# Patient Record
Sex: MALE | Race: WHITE | NOT HISPANIC OR LATINO | Employment: FULL TIME | URBAN - METROPOLITAN AREA
[De-identification: names, ages, dates, MRNs, and addresses within clinical notes are randomized per-mention and may not be internally consistent; named-entity substitution may affect disease eponyms.]

---

## 2021-04-05 ENCOUNTER — OFFICE VISIT - HEALTHEAST (OUTPATIENT)
Dept: FAMILY MEDICINE | Facility: CLINIC | Age: 36
End: 2021-04-05

## 2021-04-05 DIAGNOSIS — J45.20 MILD INTERMITTENT ASTHMA WITHOUT COMPLICATION: ICD-10-CM

## 2021-04-05 ASSESSMENT — MIFFLIN-ST. JEOR: SCORE: 2223.33

## 2021-05-04 ENCOUNTER — HOSPITAL ENCOUNTER (EMERGENCY)
Dept: EMERGENCY MEDICINE | Facility: CLINIC | Age: 36
Discharge: HOME | End: 2021-05-04
Attending: EMERGENCY MEDICINE
Payer: COMMERCIAL

## 2021-05-04 ENCOUNTER — OFFICE VISIT - HEALTHEAST (OUTPATIENT)
Dept: FAMILY MEDICINE | Facility: CLINIC | Age: 36
End: 2021-05-04

## 2021-05-04 DIAGNOSIS — R41.0 CONFUSION: ICD-10-CM

## 2021-05-04 DIAGNOSIS — S06.0X0A CONCUSSION WITHOUT LOSS OF CONSCIOUSNESS, INITIAL ENCOUNTER: ICD-10-CM

## 2021-05-04 DIAGNOSIS — S09.90XA TRAUMATIC INJURY OF HEAD, INITIAL ENCOUNTER: ICD-10-CM

## 2021-05-10 ENCOUNTER — OFFICE VISIT - HEALTHEAST (OUTPATIENT)
Dept: FAMILY MEDICINE | Facility: CLINIC | Age: 36
End: 2021-05-10

## 2021-05-10 DIAGNOSIS — S06.0X0D CONCUSSION WITHOUT LOSS OF CONSCIOUSNESS, SUBSEQUENT ENCOUNTER: ICD-10-CM

## 2021-05-10 ASSESSMENT — MIFFLIN-ST. JEOR: SCORE: 2192.48

## 2021-05-27 VITALS
HEART RATE: 65 BPM | WEIGHT: 268 LBS | OXYGEN SATURATION: 97 % | SYSTOLIC BLOOD PRESSURE: 129 MMHG | RESPIRATION RATE: 16 BRPM | DIASTOLIC BLOOD PRESSURE: 87 MMHG | TEMPERATURE: 98.1 F

## 2021-05-27 VITALS — WEIGHT: 268 LBS

## 2021-05-27 VITALS
HEIGHT: 74 IN | HEART RATE: 76 BPM | BODY MASS INDEX: 33.97 KG/M2 | SYSTOLIC BLOOD PRESSURE: 128 MMHG | DIASTOLIC BLOOD PRESSURE: 90 MMHG | WEIGHT: 264.7 LBS | OXYGEN SATURATION: 99 %

## 2021-05-28 ASSESSMENT — ASTHMA QUESTIONNAIRES: ACT_TOTALSCORE: 16

## 2021-05-29 ENCOUNTER — RECORDS - HEALTHEAST (OUTPATIENT)
Dept: ADMINISTRATIVE | Facility: CLINIC | Age: 36
End: 2021-05-29

## 2021-06-05 ENCOUNTER — HEALTH MAINTENANCE LETTER (OUTPATIENT)
Age: 36
End: 2021-06-05

## 2021-06-05 VITALS
HEART RATE: 98 BPM | HEIGHT: 74 IN | OXYGEN SATURATION: 99 % | BODY MASS INDEX: 34.84 KG/M2 | SYSTOLIC BLOOD PRESSURE: 138 MMHG | DIASTOLIC BLOOD PRESSURE: 92 MMHG | WEIGHT: 271.5 LBS

## 2021-06-16 PROBLEM — E66.9 OBESITY (BMI 30-39.9): Status: ACTIVE | Noted: 2021-04-05

## 2021-06-16 PROBLEM — J45.20 MILD INTERMITTENT ASTHMA: Status: ACTIVE | Noted: 2021-04-05

## 2021-06-16 NOTE — PROGRESS NOTES
"    Assessment & Plan     Mild intermittent asthma without complication    I did refill his albuterol inhaler today.  We talked about allergy medications which might be helpful as well.  He does take Zyrtec-D on occasion, but did not require a refill of that today.  We talked about some other options we could use that I do not think he needs a steroid inhaler at this time as it seems to be pretty seasonal and fairly mild.    We also talked to him today about coming out in for a physical at his convenience or we could do some screening blood work and catch him up on preventative issues.      - albuterol (PROAIR HFA;PROVENTIL HFA;VENTOLIN HFA) 90 mcg/actuation inhaler; Inhale 2 puffs every 6 (six) hours as needed for wheezing.    69}     BMI:   Estimated body mass index is 35.33 kg/m  as calculated from the following:    Height as of this encounter: 6' 1.5\" (1.867 m).    Weight as of this encounter: 271 lb 8 oz (123.2 kg).       No follow-ups on file.    Jeremy Resendez MD  [facility]   Luís Gamez is 36 y.o. and presents today for the following health issues   HPI     New patient to our clinic who is here today for refills of his asthma medication.  He states that he has mild intermittent asthma and uses an albuterol inhaler at times when he has some wheezing.  It seems to be mostly spring and the fall we will get a couple of weeks and in each season where he gets a bit wheezy.  He also notices it sometimes when he exercises.  He uses an antihistamine at times when he gets allergy symptoms and wheezing as well and that seems to help a bit as well.  He has not had any of the medication right now and would like to have a refill and get a couple of them if he could.    Review of Systems    A comprehensive Review of Systems was performed, and other than the positives mentioned above, the ROS was negative.     Patient is new patient to the clinic. Please see past medical " "history, surgical history, social history and family history, all of which were completed in their entirety today.         Objective    BP (!) 138/92 (Patient Site: Left Arm, Patient Position: Sitting, Cuff Size: Adult Large)   Pulse 98   Ht 6' 1.5\" (1.867 m)   Wt (!) 271 lb 8 oz (123.2 kg)   SpO2 99%   BMI 35.33 kg/m    Body mass index is 35.33 kg/m .  Physical Exam    Well-appearing male no acute distress.  Vital signs as noted.  His chest is clear to auscultation all fields today.  Heart regular rate and rhythm.              "

## 2021-06-17 NOTE — ED TRIAGE NOTES
Pt hit top of  head on washing machine last night. Denies LOC. States pain is 4/10. Pt slow to respond to questions and wife states he is acting real different. Pt hand grasps equal but pt states right hand has been going numb on and off today. C collar put on pt. Pt wife states he has had issues with his neck and concussions in the past. No marks or dried blood on head.

## 2021-06-17 NOTE — ED NOTES
Expected Patient Referral to ED  4:48 PM    Referring Clinic/Provider:  [facility].     Reason for referral/Clinical facts:  Concern for absence seizures or head injury.     Recommendations provided:  We do not have neuro coverage at . [facility] or other facility may be more appropriate if concern for neuro problem such as seizure. Would be happy to evaluate at  if needed.     Caller was informed that this institution does not possess the capabilities and/or resources to provide for patient and should be transferred to another institution.    Based on the information provided, discussed that this patient likely is not a good candidate for direct admission to this institution and that provider could proceed as such.  If however direct admit is sought and any hurdles encountered, this ED would be happy to see the patient and evaluate.    Informed caller that recommendations provided are recommendations based only on the facts provided and that they responsible to accept or reject the advice, or to seek a formal in person consultation as needed and that this ED will see/treat patient should they arrive.      Jass Kwok M.D.  Emergency Medicine  [facility] EMERGENCY ROOM  [address] [address]  Dept: [phone]  Loc: [phone]       Jass Kwok MD  05/04/21 5574

## 2021-06-17 NOTE — PROGRESS NOTES
Assessment & Plan:       1. Traumatic injury of head, initial encounter     2. Confusion           Medical Decision Making  Patient presents with acute head trauma.  Patient's examination is concerning for possible intracranial hemorrhage versus absence seizure's versus stroke given his level of confusion.  Throughout my examination and while talking with the patient and the patient's wife, the patient would appear to go blank with a flat affect and become confused as if he forgot where he was.  After calling the patient's name, patient would jump and appears startled before realizing where he was again.  When questioning the patient, he is orientated to person, place, and time.  Recommend the patient be seen at the [facility] emergency room for immediate evaluation of head trauma associated with confusion.  Called [facility] ED and discussed case with provider.  Patient is otherwise vitally stable at this time, and patient and patient's family feel comfortable leaving by private vehicle.      Subjective:       History provided by patient's wife.  Luís Gamez is a 36 y.o. male here for evaluation of head trauma.  Event of injury occurred last night.  Patient struck the top of his head on the top of the washing machine door.  He developed instant headache.  Patient went to bed and then woke up this morning with the same headache.  As the day has gone on, patient has become increasingly confused per the wife.  Wife states that patient will be wincing like he is in pain, and then suddenly developed a blank expression for several seconds.  Associated symptoms include tingling in the right hand.  Patient otherwise denies vision changes, nausea, vomiting, slurred speech, and muscle weakness.  Patient has history of 3 concussions.    The following portions of the patient's history were reviewed and updated as appropriate: allergies, current medications and problem list.    Review of Systems  Pertinent items are noted in  HPI.     Allergies  Allergies   Allergen Reactions     Amoxicillin Unknown     Clavulanic Acid      Seasonale (91) [Levonorgestrel-Ethinyl Estrad]      Spring and Fall     Shellfish Containing Products      Other reaction(s): *Unknown     Cat Dander Other (See Comments)     Itchy back of throat and watery eyes     Environmental [Flowers] Other (See Comments)     Flower - Connie - Itchy back of throat and watery eyes       Family History   Problem Relation Age of Onset     No Medical Problems Mother      Hyperlipidemia Father      Diabetes Father      Diabetes Paternal Grandmother      Diabetes Paternal Grandfather        Social History     Socioeconomic History     Marital status: Single     Spouse name: None     Number of children: None     Years of education: None     Highest education level: None   Occupational History     None   Social Needs     Financial resource strain: None     Food insecurity     Worry: None     Inability: None     Transportation needs     Medical: None     Non-medical: None   Tobacco Use     Smoking status: Never Smoker     Smokeless tobacco: Never Used   Substance and Sexual Activity     Alcohol use: Yes     Alcohol/week: 3.0 - 4.0 standard drinks     Types: 3 - 4 Shots of liquor per week     Drug use: Never     Sexual activity: Yes     Partners: Female   Lifestyle     Physical activity     Days per week: None     Minutes per session: None     Stress: None   Relationships     Social connections     Talks on phone: None     Gets together: None     Attends [employer] service: None     Active member of club or organization: None     Attends meetings of clubs or organizations: None     Relationship status: None     Intimate partner violence     Fear of current or ex partner: None     Emotionally abused: None     Physically abused: None     Forced sexual activity: None   Other Topics Concern     None   Social History Narrative     None         Objective:       /87   Pulse 65   Temp 98.1  F  (36.7  C)   Resp 16   Wt (!) 268 lb (121.6 kg)   SpO2 97%   BMI 34.88 kg/m    General appearance: appears stated age, cooperative, mild distress and non-toxic  Neurologic: Patient orientated to person place and time, but during review of history patient would frequently go blank and developed a flat affect, we would have to call the patient's name and he would jump is a surprised or startled

## 2021-06-17 NOTE — ED PROVIDER NOTES
EMERGENCY DEPARTMENT ENCOUNTER      NAME: Luís Gamez  AGE: 36 y.o. male  YOB: 1985  MRN: [id number]  EVALUATION DATE & TIME: 5/4/2021  5:12 PM    PCP: Provider, No Primary Care    ED PROVIDER: James Abreu M.D.      Chief Complaint   Patient presents with     Head Injury     Numbness         FINAL IMPRESSION:  1. Concussion without loss of consciousness, initial encounter          ED COURSE & MEDICAL DECISION MAKING:    Pertinent Labs & Imaging studies reviewed. (See chart for details)    5:21 PM I cleared the patient's C-spine.  5:36 PM I met with the patient to gather history and to perform my initial exam.  I discussed treatment options and the plan for care while in the Emergency Department. Proper PPE worn throughout all patient interactions including an n95 mask, gloves and goggles.   7:29 PM Rechecked the patient and updated on results. He reports feeling much better. We discussed plan for discharge with neurology follow up to which he is agreeable.        Patient is a 36-year-old gentleman with history of prior concussions who presents with headache, behavioral changes that began at about 9 PM last night when he struck his head on the door of a washer machine.  No loss of consciousness at that time, patient does not seem to have any gaps in his memory since then.  His significant other is concerned to these episodic staring spells.  In the room he has had a couple of these, but he it does not seem to be consistent with an absence seizure, he is incredibly redirectable during these and it appears as though he is more distracted by his pain and there is possibly some mild confusion due to postconcussive syndrome.    He also explain some intermittent paresthesias in his right hand today.  Because of the head injury, intermittent symptoms in his hand and the fact that he gets regular cervical manipulations by a chiropractor a CTA of the head and neck was ordered.    Patient was feeling much  better after IV migraine cocktail, CTA of the head and neck was negative for any injury or dissection, he was sent home with reassurance, concussion information and follow-up as needed.    At the conclusion of the encounter I discussed the results of all of the tests and the disposition. The questions were answered. The patient or family acknowledged understanding and was agreeable with the care plan.       MEDICATIONS GIVEN IN THE EMERGENCY:  Medications   sodium chloride 0.9% 1,000 mL (0 mL Intravenous Stopped 5/4/21 1900)   metoclopramide injection 10 mg (REGLAN) (10 mg Intravenous Given 5/4/21 1808)   diphenhydrAMINE injection 50 mg (BENADRYL) (50 mg Intravenous Given 5/4/21 1808)   iopamidol solution 100 mL (ISOVUE-370) (75 mL Intravenous Given 5/4/21 1828)       NEW PRESCRIPTIONS STARTED AT TODAY'S ER VISIT  Discharge Medication List as of 5/4/2021  7:42 PM      CONTINUE these medications which have NOT CHANGED    Details   albuterol (PROAIR HFA;PROVENTIL HFA;VENTOLIN HFA) 90 mcg/actuation inhaler Inhale 2 puffs every 6 (six) hours as needed for wheezing., Starting Mon 4/5/2021, Normal      aspirin-acetaminophen-caffeine (EXCEDRIN MIGRAINE) 250-250-65 mg per tablet Take 1 tablet by mouth every 6 (six) hours as needed for pain., Historical Med      cetirizine HCl/pseudoephedrine (ZYRTEC-D ORAL) Take by mouth., Historical Med                  =================================================================    HPI    Patient information was obtained from: the patient and his wife    Use of : N/A        Luís Gamez is a 36 y.o. male with a pertinent history of asthma and obesity who presents to this ED for evaluation of a headache.     Patient reports that around 2100 last night (~21 hours ago) he was bending over and as he was getting up he accidentally hit the top of his head on the washing machine. Denies any loss of consciousness or falls at this time. Patient had a headache to the top of his  head immediately afterwards and then went to bed a couple of minutes later, which is normal for him as he typically goes to sleep around this time. Patient was feeling well and at his baseline prior to his head injury. He reports that he slept throughout the night.    Today, the patient reports feeling more loopy and fuzzy than normal and endorses a headache rated 6/10 in severity to the top of his head with associated photophobia. He states that his pain waxes and wanes in severity and he will intermittently get episodes of sharp pain in his head. Patient reports one episode of dizziness this afternoon while going down the stairs but states that this has since resolved.     He presented to his clinic for evaluation prior to ED arrival and notes that he had some right hand numbness while in clinic that has since resolved. Patient denies any history of similar symptoms. He does have a history of concussions and a pinched nerve in his neck so it's not abnormal for him to get headaches. Patient does follow up with a chiropractor for this where he gets his neck realigned and notes that his last realignment was ~2 weeks ago. Patient denies any nausea or vomiting. He endorses an allergy to Penicillin and denies any other known allergies to medications. Patient denies any focal numbness, weakness, gait disturbance or any other physical complaints at this time.     Of note, patient's wife reports that the patient seems off to her and will have episodes of wincing that are abnormal for him.     REVIEW OF SYSTEMS   Review of Systems   Eyes: Positive for photophobia.   Gastrointestinal: Negative for nausea and vomiting.   Musculoskeletal: Negative for gait problem.        No falls.    Neurological: Positive for dizziness (resolved), numbness (right hand, resolved) and headaches. Negative for syncope and weakness (focal).   Psychiatric/Behavioral: Positive for confusion.   All other systems reviewed and are negative.       PAST  MEDICAL HISTORY:  Past Medical History:   Diagnosis Date     Asthma        PAST SURGICAL HISTORY:  No past surgical history on file.    CURRENT MEDICATIONS:    No current facility-administered medications on file prior to encounter.      Current Outpatient Medications on File Prior to Encounter   Medication Sig     albuterol (PROAIR HFA;PROVENTIL HFA;VENTOLIN HFA) 90 mcg/actuation inhaler Inhale 2 puffs every 6 (six) hours as needed for wheezing.     aspirin-acetaminophen-caffeine (EXCEDRIN MIGRAINE) 250-250-65 mg per tablet Take 1 tablet by mouth every 6 (six) hours as needed for pain.     cetirizine HCl/pseudoephedrine (ZYRTEC-D ORAL) Take by mouth.       ALLERGIES:  Allergies   Allergen Reactions     Amoxicillin Unknown     Clavulanic Acid      Seasonale (91) [Levonorgestrel-Ethinyl Estrad]      Spring and Fall     Shellfish Containing Products      Other reaction(s): *Unknown     Cat Dander Other (See Comments)     Itchy back of throat and watery eyes     Environmental [Flowers] Other (See Comments)     Flower - Connie - Itchy back of throat and watery eyes       FAMILY HISTORY:  Family History   Problem Relation Age of Onset     No Medical Problems Mother      Hyperlipidemia Father      Diabetes Father      Diabetes Paternal Grandmother      Diabetes Paternal Grandfather        SOCIAL HISTORY:   Social History     Socioeconomic History     Marital status: Single     Spouse name: Not on file     Number of children: Not on file     Years of education: Not on file     Highest education level: Not on file   Occupational History     Not on file   Social Needs     Financial resource strain: Not on file     Food insecurity     Worry: Not on file     Inability: Not on file     Transportation needs     Medical: Not on file     Non-medical: Not on file   Tobacco Use     Smoking status: Never Smoker     Smokeless tobacco: Never Used   Substance and Sexual Activity     Alcohol use: Yes     Alcohol/week: 3.0 - 4.0 standard  "drinks     Types: 3 - 4 Shots of liquor per week     Drug use: Never     Sexual activity: Yes     Partners: Female   Lifestyle     Physical activity     Days per week: Not on file     Minutes per session: Not on file     Stress: Not on file   Relationships     Social connections     Talks on phone: Not on file     Gets together: Not on file     Attends [employer] service: Not on file     Active member of club or organization: Not on file     Attends meetings of clubs or organizations: Not on file     Relationship status: Not on file     Intimate partner violence     Fear of current or ex partner: Not on file     Emotionally abused: Not on file     Physically abused: Not on file     Forced sexual activity: Not on file   Other Topics Concern     Not on file   Social History Narrative     Not on file       VITALS:  Patient Vitals for the past 24 hrs:   BP Temp Temp src Pulse Resp SpO2 Weight   05/04/21 1950 140/87 -- -- -- -- -- --   05/04/21 1811 (!) 142/93 -- -- 68 -- 99 % --   05/04/21 1730 (!) 147/93 -- -- 66 -- 96 % --   05/04/21 1716 (!) 161/106 -- -- 63 -- 97 % --   05/04/21 1713 (!) 162/107 98.7  F (37.1  C) Oral 64 18 100 % (!) 268 lb (121.6 kg)       PHYSICAL EXAM    Constitutional: Well developed, well nourished. Comfortable appearing. Episodic \"spacing out\" where the patient stares straight ahead. He is redirectable with touch and able to answer questions.   HENT: Normocephalic, atraumatic, wearing face mask. Neck- Supple, gross ROM intact.   Eyes: Pupils mid-range, conjunctiva without injection, no discharge.   Respiratory: Clear to auscultation bilaterally, no respiratory distress, no wheezing, speaks full sentences easily. No cough.  Cardiovascular: Normal heart rate, regular rhythm, no murmurs. No pedal edema, 2+ DP pulses.   GI: Soft, no tenderness to deep palpation in all quadrants, no masses.  Musculoskeletal: Moving all 4 extremities intentionally and without pain. No obvious deformity.  Skin: Warm, " dry, no rash.  Neurologic: Alert & oriented x 3. CN II-XII intact. 5/5 strength in upper and lower extremities. No dysarthria or dysphagia. Sensation to light touch intact in all 4 extremities. No pronator drift. Normal rapid alternating movements bilaterally. Steady gait.  Psychiatric: Affect normal, cooperative.     LAB:  All pertinent labs reviewed and interpreted.  No results found for this visit on 05/04/21.    RADIOLOGY:  Reviewed all pertinent imaging. Please see official radiology report.  Cta Head And Neck    Result Date: 5/4/2021  EXAM: CTA HEAD AND NECK LOCATION: [facility] DATE/TIME: 5/4/2021 6:39 PM INDICATION: Concussion. Headache. Intermittent lapse in consciousness. Dissection evaluation. COMPARISON: None available at time of dictation. CONTRAST: Iopamidol (Isovue-370) 75mL TECHNIQUE: Head and neck CT angiogram with IV contrast. Noncontrast head CT followed by axial helical CT images of the head and neck vessels obtained during the arterial phase of intravenous contrast administration. Axial 2D reconstructed images and multiplanar 3D MIP reconstructed images of the head and neck vessels were performed by the technologist. Dose reduction techniques were used. All stenosis measurements made according to NASCET criteria unless otherwise specified. FINDINGS: NONCONTRAST HEAD CT: INTRACRANIAL CONTENTS: No acute intracranial hemorrhage. No CT evidence of acute infarct. Normal ventricles without hydrocephalus. No extra-axial fluid collection. Patent basal cisterns. VISUALIZED ORBITS/SINUSES/MASTOIDS: The orbits are unremarkable. Mild paranasal sinus mucosal thickening. The temporal bone structures are well-aerated. BONES/SOFT TISSUES: The calvarium and skull base are unremarkable. HEAD CTA: ANTERIOR CIRCULATION: No stenosis/occlusion, aneurysm, or high flow vascular malformation. The anterior communicating artery is patent. The posterior communicating arteries are  hypoplastic/absent. POSTERIOR CIRCULATION: No stenosis/occlusion, aneurysm, or high flow vascular malformation. Left dominant vertebrobasilar circulation. DURAL VENOUS SINUSES: Patent. NECK CTA: RIGHT CAROTID: Normal course and persist caliber of the common carotid artery. Normal course and persist caliber of the extracranial internal carotid artery without evidence of stenosis or dissection. LEFT CAROTID: Normal course and persist caliber of the common carotid artery. Normal course and persist caliber of the extracranial internal carotid artery without evidence of stenosis or dissection. VERTEBRAL ARTERIES: Left dominant configuration without stenosis or dissection. AORTIC ARCH: Classic three-vessel arch. The origins of the great vessels are unremarkable without significant stenosis. NONVASCULAR STRUCTURES: There are no masses or enlarged lymph nodes in the neck. The submandibular, parotid, and thyroid glands are unremarkable. Multilevel degenerative changes without high-grade spinal canal stenosis or neural foraminal narrowing. No aggressive osseous lesions. The visualized lungs are unremarkable.     HEAD CT: 1. No acute intracranial abnormality. HEAD CTA: 1. Unremarkable intracranial vasculature. NECK CTA: 1. Unremarkable neck vasculature.         Procedures  None.       I, Nicole Mazariegos, am serving as a scribe to document services personally performed by Dr. James Abreu based on my observation and the provider's statements to me. IJames MD attest that Nicole Mazariegos is acting in a scribe capacity, has observed my performance of the services and has documented them in accordance with my direction.    James Abreu M.D.  Emergency Medicine  [facility] EMERGENCY ROOM  [facility] [facility] [address]  Dept: [phone]  Loc: [phone]         James Abreu MD  05/04/21 0333

## 2021-06-17 NOTE — PROGRESS NOTES
"    Assessment & Plan     Concussion without loss of consciousness, subsequent encounter    Seems to be getting better every day from this and I reassured him that that is a good pattern for him to be in.  He was cautioned though, that he will get tired probably over the next couple of weeks especially at the end of the day after working up for a 8-hour shift.  He is comfortable doing that and does not feel he needs any work restrictions.  He knows to just take it easy over the next couple of weeks, and if he is not getting better or if things seem to be getting worse instead of better he will let me know.    He has had a lot of concussions which is a little bit concerning that these effects may be additive and that we will have to continue to watch that going forward.      Review of prior external note(s) from - Outside records from ED  269}     BMI:   Estimated body mass index is 34.45 kg/m  as calculated from the following:    Height as of this encounter: 6' 1.5\" (1.867 m).    Weight as of this encounter: 264 lb 11.2 oz (120.1 kg).       No follow-ups on file.    Jeremy Resendez MD  [facility]   Luís Gamez is 36 y.o. and presents today for the following health issues   HPI     Patient here today for a follow-up in the ED.  He was seen last week with a head injury.  He was putting holes in the washer on the front loading machine and had bent over and somehow the dryer door came open and he came up too quickly and hit the top of his head on the dryer door.  He did not really have a loss of consciousness but the next day he was still feeling pretty woozy and was just not able to focus very well so they took him into the ED.  He went in and get a CT of the head which was negative for any anatomical damage or bleeding so he was reassured that this was a concussion and told to follow-up here.    He states that he is getting better and still gets tired at the end of the day " "but overall feels things are improving.      Review of Systems    Other than what is mentioned above his review of systems is negative.      Objective    /90 (Patient Site: Left Arm, Patient Position: Sitting, Cuff Size: Adult Large)   Pulse 76   Ht 6' 1.5\" (1.867 m)   Wt (!) 264 lb 11.2 oz (120.1 kg)   SpO2 99%   BMI 34.45 kg/m    Body mass index is 34.45 kg/m .  Physical Exam    Well-appearing male no acute distress.  Vital signs as noted.  Cranial nerves II through XII are intact.  Chest clear to auscultation.  Heart regular rate and rhythm.  His neurologic exam is nonfocal.              "

## 2021-12-11 ENCOUNTER — ANCILLARY PROCEDURE (OUTPATIENT)
Dept: GENERAL RADIOLOGY | Facility: CLINIC | Age: 36
End: 2021-12-11
Attending: STUDENT IN AN ORGANIZED HEALTH CARE EDUCATION/TRAINING PROGRAM
Payer: COMMERCIAL

## 2021-12-11 ENCOUNTER — OFFICE VISIT (OUTPATIENT)
Dept: FAMILY MEDICINE | Facility: CLINIC | Age: 36
End: 2021-12-11
Payer: COMMERCIAL

## 2021-12-11 VITALS
BODY MASS INDEX: 33.84 KG/M2 | SYSTOLIC BLOOD PRESSURE: 135 MMHG | RESPIRATION RATE: 18 BRPM | DIASTOLIC BLOOD PRESSURE: 86 MMHG | TEMPERATURE: 98.1 F | HEART RATE: 88 BPM | WEIGHT: 260 LBS | OXYGEN SATURATION: 98 %

## 2021-12-11 DIAGNOSIS — S40.011A CONTUSION OF RIGHT SHOULDER, INITIAL ENCOUNTER: ICD-10-CM
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
## 2021-12-11 PROCEDURE — 73030 X-RAY EXAM OF SHOULDER: CPT | Mod: TC | Performed by: RADIOLOGY

## 2021-12-11 PROCEDURE — 99213 OFFICE O/P EST LOW 20 MIN: CPT | Performed by: STUDENT IN AN ORGANIZED HEALTH CARE EDUCATION/TRAINING PROGRAM

## 2021-12-11 RX ORDER — IBUPROFEN 200 MG
200 TABLET ORAL EVERY 4 HOURS PRN
COMMUNITY
End: 2023-02-14

## 2021-12-11 RX ORDER — FLUTICASONE PROPIONATE 50 MCG
1 SPRAY, SUSPENSION (ML) NASAL DAILY
COMMUNITY
End: 2023-02-14

## 2021-12-11 NOTE — PROGRESS NOTES
Assessment & Plan     Accident due to mechanical fall without injury, initial encounter  Patient has full range of motion, shoulder x-ray showed no dislocation or fractures in the glenohumeral or AC joint.  Patient is diagnosed with a right shoulder contusion okay to continue NSAIDs and over-the-counter acetaminophen as needed for pain.  Counseling on rest, icing, compression and elevation given.   - XR Shoulder Right 2 Views    Contusion of right shoulder, initial encounter      Return if symptoms worsen or fail to improve.    Nick Segundo MD  [facility]    Romy Staley is a 36 year old who presents for the following health issues  HPI     Patient is an otherwise healthy 36-year-old male who presents after mechanical fall onto his right shoulder and elbow. This happened 2 hours prior to presenting to urgent care while shoveling snow in his driveway. He states that immediately after he had anterior shoulder pain afterwards and took 600 mg of ibuprofen. He does have a remote history of shoulder dislocation that same shoulder when he was 12 years old. He denies any numbness, tingling, weakness in his arm or hand. He denies any clicking or catching with movements or instability. He does have complete range of motion with shoulder pain is elicited with itching of his head.    Review of Systems   Constitutional, HEENT, cardiovascular, pulmonary, gi and gu systems are negative, except as otherwise noted.      Objective    /86   Pulse 88   Temp 98.1  F (36.7  C)   Resp 18   Wt 117.9 kg (260 lb)   SpO2 98%   BMI 33.84 kg/m    Body mass index is 33.84 kg/m .  Physical Exam   GENERAL: healthy, alert and no distress  EYES: Eyes grossly normal to inspection, PERRL and conjunctivae and sclerae normal  NECK: no adenopathy, no asymmetry, masses, or scars and thyroid normal to palpation  MS: no gross musculoskeletal defects noted, no edema  Shoulder:   Inspection:  asymmetry? No; dyskinesis? No  ROM:   Active - forward flexion - full/ abduction - full/ int rot - symmetric/ ext rot - symmetric   Passive- forward flexion - full/ abduction - full   Strength: deltoid/supraspinatous - 5/5; infraspinatous/ teres minor - 5/5; subscapularis - 5/5   Maneuvers:   RTC - empty can - neg; painful arc - neg; lift off - neg   Impingement - Quispe -neg; Neer- neg   AC - cross arm - elicits pain  Biceps - Speed's - neg; Yergason's - neg   Instability - Apprehension - neg   Palpation: AC - neg; Acromion/ supraspinatus - neg; scapula - neg; bicipital groove - neg   SKIN: no suspicious lesions or rashes  NEURO: Normal strength and tone, mentation intact and speech normal  PSYCH: mentation appears normal, affect normal/bright    Results for orders placed or performed in visit on 12/11/21   XR Shoulder Right 2 Views    Impression    IMPRESSION: The right glenohumeral and acromioclavicular joints are negative for fracture or dislocation.

## 2021-12-27 ENCOUNTER — VIRTUAL VISIT (OUTPATIENT)
Dept: FAMILY MEDICINE | Facility: CLINIC | Age: 36
End: 2021-12-27
Payer: COMMERCIAL

## 2021-12-27 DIAGNOSIS — U07.1 INFECTION DUE TO 2019 NOVEL CORONAVIRUS: Primary | ICD-10-CM

## 2021-12-27 PROCEDURE — 99213 OFFICE O/P EST LOW 20 MIN: CPT | Mod: GT | Performed by: FAMILY MEDICINE

## 2021-12-27 RX ORDER — ALBUTEROL SULFATE 90 UG/1
2 AEROSOL, METERED RESPIRATORY (INHALATION)
COMMUNITY
Start: 2021-04-05 | End: 2023-02-14

## 2021-12-27 NOTE — PROGRESS NOTES
Luís is a 36 year old who is being evaluated via a billable video visit.      How would you like to obtain your AVS? MyChart    Will anyone else be joining your video visit? No      Video Start Time: 10:08 AM    Assessment & Plan     Infection due to 2019 novel coronavirus  For now, supportive cares.  Practice self-observation and remain alert for worsening symptoms.  Self isolate in the home until your symptoms are better.      Dave Castro MD  [facility] [place]    Subjective   Luís is a 36 year old who presents for the following health issues     HPI     Positive home covid test today  Onset of sx 12/23/21  Symptoms include runny nose, sore throat, cough, fever, malaise  Most symptoms are better except for sore throat and ear pain  Not vaccinated for covid  Taking dayquil        Objective           Vitals:  No vitals were obtained today due to virtual visit.    Physical Exam   Constitutional: Patient is oriented to person, place, and time. Patient appears well-developed and well-nourished. No distress.   Head: Normocephalic and atraumatic.   Right Ear: External ear normal.   Left Ear: External ear normal.   Eyes: Conjunctivae and EOM are normal. Right eye exhibits no discharge. Left eye exhibits no discharge. No scleral icterus.   Neurological: Patient is alert and oriented to person, place, and time.  Skin: No rash noted. Patient is not diaphoretic. No erythema. No pallor.       Video-Visit Details    Type of service:  Video Visit    Video End Time:10:14 AM    Originating Location (pt. Location): Home    Distant Location (provider location):  [facility] [place]     Platform used for Video Visit: Julio Cesar

## 2022-07-02 ENCOUNTER — HEALTH MAINTENANCE LETTER (OUTPATIENT)
Age: 37
End: 2022-07-02

## 2022-08-08 ENCOUNTER — VIRTUAL VISIT (OUTPATIENT)
Dept: FAMILY MEDICINE | Facility: CLINIC | Age: 37
End: 2022-08-08
Payer: COMMERCIAL

## 2022-08-08 DIAGNOSIS — J45.20 MILD INTERMITTENT ASTHMA WITHOUT COMPLICATION: ICD-10-CM

## 2022-08-08 DIAGNOSIS — E66.09 CLASS 1 OBESITY DUE TO EXCESS CALORIES WITHOUT SERIOUS COMORBIDITY WITH BODY MASS INDEX (BMI) OF 34.0 TO 34.9 IN ADULT: ICD-10-CM

## 2022-08-08 DIAGNOSIS — E66.811 CLASS 1 OBESITY DUE TO EXCESS CALORIES WITHOUT SERIOUS COMORBIDITY WITH BODY MASS INDEX (BMI) OF 34.0 TO 34.9 IN ADULT: ICD-10-CM

## 2022-08-08 DIAGNOSIS — U07.1 INFECTION DUE TO 2019 NOVEL CORONAVIRUS: Primary | ICD-10-CM

## 2022-08-08 PROCEDURE — 99213 OFFICE O/P EST LOW 20 MIN: CPT | Mod: CS | Performed by: PHYSICIAN ASSISTANT

## 2022-08-08 NOTE — PATIENT INSTRUCTIONS
"  Instructions for Patients  Your COVID-19 test was positive. This means you have the virus. Please follow the \"How can I take care of myself\" and \"How do I self-isolate?\" guidelines in these instructions.    What treatments are available?  Over-the-counter medicines may help with your symptoms such as runny or stuffy nose, cough, chills, and fever. Talk to your care team about your options.     Some people are at high risk for severe illness (for example if you have a weak immune system, you're 65 or older, or you have certain medical problems). If your risk it high and your symptoms started in the last 5 to 7 days, we strongly recommend for you to get COVID treatment as soon as possible before you get really sick. Paxlovid, Molnupiravir and the monoclonal antibody treatments are proven safe and effective, make you feel better faster, and prevent hospitalization and death.       You can schedule an appointment to discuss COVID treatment by requesting an appointment on [place] by selecting \"schedule COVID-19 Treatment\" or by calling makr ([phone]) and pressing 7.    What are the symptoms of COVID-19?  Symptoms can include fever, cough, shortness of breath, chills, headache, muscle pain sore throat, fatigue, runny or stuffy nose, and loss of taste and smell. Other less common symptoms include nausea, vomiting, or diarrhea (watery stools).    Know when to call 911. Emergency warning signs include:    Trouble breathing or shortness of breath    Pain or pressure in the chest that doesn't go away    Feeling confused like you haven't felt before, or not being able to wake up    Bluish-colored lips or face    How can I take care of myself?  1. Get lots of rest. Drink extra fluids (unless a doctor has told you not to).  2. Take Tylenol (acetaminophen) for fever or pain. If you have liver or kidney problems, ask your family doctor if it's okay to take Tylenol   Adults:   650 mg (two 325 mg pills or tablets) " every 4 to 6 hours, or...   1,000 mg (two 500 mg pills or tablets) every 8 hours as needed.  Note: Don't take more than 3,000 mg in one day. Acetaminophen is found in many medicines (both prescribed and over the counter). Read all labels to be sure you don't take too much.  For children, check the Tylenol bottle for the right dose. The dose is based on the child's age or weight.  3. Take over the counter medicines for your symptoms as needed. Talk to your pharmacist.  4. If you have other health problems (like cancer, heart failure, an organ transplant, or severe kidney disease): Call your specialty clinic if you don't feel better in the next 2 days.    These guidelines are for isolating and quarantining before returning to work, school or .     For employers, schools and day cares: This is an official notice for this person s medical guidelines for returning in-person.     For health care sites: The CDC gives different isolation and quarantine guidelines for healthcare sites, please check with these sites before arriving.     How do I self-isolate?  You isolate when you have symptoms of COVID or a test shows you have COVID, even if you don t have symptoms.     If you DO have symptoms:  o Stay home and away from others  - For at least 5 days after your symptoms started, AND   - You are fever free for 24 hours (with no medicine that reduces fever), AND  - Your other symptoms are better.  o Wear a mask for 10 full days any time you are around others.    If you DON T have symptoms:  o Stay at home and away from others for at least 5 days after your positive test.  o Wear a mask for 10 full days any time you are around others.    How and when do I quarantine?  You quarantine when you may have been exposed to the virus and DON T have symptoms.     Stay home and away from others.     You must quarantine for 5 days after your last contact with a person who has COVID.  o Note: If you are fully vaccinated, you don t  need to quarantine. You should still follow the steps below.     Wear a mask for 10 full days any time you re around others.    Get tested at least 5 days after you were exposed, even if you don t have symptoms.     If you start to have symptoms, isolate right away and get tested.    Where can I get more information?    [facility] COVID-19 Resource Hub: www.bulletn..org/covid19/     CDC Quarantine & Isolation: https://www.cdc.gov/coronavirus/2019-ncov/your-health/quarantine-isolation.html     CDC - What to Do If You're Sick: https://www.cdc.gov/coronavirus/2019-ncov/if-you-are-sick/index.html    [facility] clinical trials (COVID-19 research studies): [email or web address].[email or web address]/umn-clinical-trials    [place] Department of Health COVID-19 Public Hotline: 1-297.342.4102

## 2022-08-08 NOTE — PROGRESS NOTES
George is a 37 year old who is being evaluated via a billable video visit.      How would you like to obtain your AVS? MyChart  If the video visit is dropped, the invitation should be resent by: Other e-mail: [phone]   Will anyone else be joining your video visit? No      Assessment & Plan       ICD-10-CM    1. Infection due to 2019 novel coronavirus  U07.1 nirmatrelvir and ritonavir (PAXLOVID) therapy pack   2. Mild intermittent asthma without complication  J45.20    3. Class 1 obesity due to excess calories without serious comorbidity with body mass index (BMI) of 34.0 to 34.9 in adult  E66.09     Z68.34      Unvaccinated patient with the above co-morbidities, at high risk for severe infection. Take Paxlovid as prescribed, even if sx improve. Get plenty of rest and fluids. No evidence of asthma exacerbation or hypoxia, continue use of albuterol inhaler prn.    Return in about 1 week (around 8/15/2022), or if symptoms worsen or fail to improve.    KECIA Matta [facility]    Subjective   George is a 37 year old, presenting for the following health issues:  covid treatment ([place] [facility] for [facility],)      HPI         COVID-19 Symptom Review  How many days ago did these symptoms start? 08/03/2022    Are any of the following symptoms significant for you?  New or worsening difficulty breathing? Yes    Please describe what kind of difficulty you are having breathing: shortness of breath when coughing    Worsening cough? Yes, it's a dry cough.    Fever or chills? Yes, I felt feverish or had chills.    Headache: YES    Sore throat: No    Chest pain: No    Diarrhea: YES    Body aches? YES    What treatments has patient tried? Cough syrup   Does patient live in a nursing home, group home, or shelter? No  Does patient have a way to get food/medications during quarantined? Yes, I have a friend or family member who can help me.          Review of Systems   Constitutional, HEENT,  cardiovascular, pulmonary, GI, , musculoskeletal, neuro, skin, endocrine and psych systems are negative, except as otherwise noted.      Objective           Vitals:  No vitals were obtained today due to virtual visit.    Physical Exam   GENERAL: Healthy, alert and no distress  EYES: Eyes grossly normal to inspection.  No discharge or erythema, or obvious scleral/conjunctival abnormalities.  HENT: Normal cephalic/atraumatic.  External ears, nose and mouth without ulcers or lesions.  No nasal drainage visible.  NECK: No asymmetry, visible masses or scars  RESP: No audible wheeze, cough, or visible cyanosis.  No visible retractions or increased work of breathing.    MS: No gross musculoskeletal defects noted.  Normal range of motion.  No visible edema.  SKIN: Visible skin clear. No significant rash, abnormal pigmentation or lesions.  PSYCH: Mentation appears normal, affect normal/bright, judgement and insight intact, normal speech and appearance well-groomed.                Video-Visit Details    Video Start Time: 1143    Type of service:  Video Visit    Video End Time:1154    Originating Location (pt. Location): Home    Distant Location (provider location):  [facility]     Platform used for Video Visit: [employer]  ..

## 2023-02-14 ENCOUNTER — OFFICE VISIT (OUTPATIENT)
Dept: FAMILY MEDICINE | Facility: CLINIC | Age: 38
End: 2023-02-14
Payer: COMMERCIAL

## 2023-02-14 VITALS
DIASTOLIC BLOOD PRESSURE: 82 MMHG | HEART RATE: 81 BPM | BODY MASS INDEX: 33.24 KG/M2 | SYSTOLIC BLOOD PRESSURE: 134 MMHG | WEIGHT: 259 LBS | RESPIRATION RATE: 18 BRPM | OXYGEN SATURATION: 98 % | TEMPERATURE: 97.7 F | HEIGHT: 74 IN

## 2023-02-14 DIAGNOSIS — J45.20 MILD INTERMITTENT ASTHMA WITHOUT COMPLICATION: Primary | ICD-10-CM

## 2023-02-14 PROCEDURE — 99213 OFFICE O/P EST LOW 20 MIN: CPT | Performed by: NURSE PRACTITIONER

## 2023-02-14 RX ORDER — ALBUTEROL SULFATE 90 UG/1
2 AEROSOL, METERED RESPIRATORY (INHALATION) EVERY 6 HOURS PRN
Qty: 36 G | Refills: 3 | Status: SHIPPED | OUTPATIENT
Start: 2023-02-14

## 2023-02-14 ASSESSMENT — ASTHMA QUESTIONNAIRES
QUESTION_5 LAST FOUR WEEKS HOW WOULD YOU RATE YOUR ASTHMA CONTROL: WELL CONTROLLED
QUESTION_4 LAST FOUR WEEKS HOW OFTEN HAVE YOU USED YOUR RESCUE INHALER OR NEBULIZER MEDICATION (SUCH AS ALBUTEROL): TWO OR THREE TIMES PER WEEK
ACUTE_EXACERBATION_TODAY: NO
QUESTION_3 LAST FOUR WEEKS HOW OFTEN DID YOUR ASTHMA SYMPTOMS (WHEEZING, COUGHING, SHORTNESS OF BREATH, CHEST TIGHTNESS OR PAIN) WAKE YOU UP AT NIGHT OR EARLIER THAN USUAL IN THE MORNING: NOT AT ALL
QUESTION_2 LAST FOUR WEEKS HOW OFTEN HAVE YOU HAD SHORTNESS OF BREATH: ONCE OR TWICE A WEEK
QUESTION_1 LAST FOUR WEEKS HOW MUCH OF THE TIME DID YOUR ASTHMA KEEP YOU FROM GETTING AS MUCH DONE AT WORK, SCHOOL OR AT HOME: NONE OF THE TIME
ACT_TOTALSCORE: 21
ACT_TOTALSCORE: 21

## 2023-02-14 NOTE — LETTER
My Asthma Action Plan    Name: Luís Gamez   YOB: 1985  Date: 2/15/2023   My doctor: Christy Spann NP   My clinic: [facility]        My Rescue Medicine:   Albuterol inhaler (Proair/Ventolin/Proventil HFA)  2-4 puffs EVERY 4 HOURS as needed. Use a spacer if recommended by your provider.   My Asthma Severity:   Intermittent / Exercise Induced  Know your asthma triggers: smoke, pollens and exercise or sports             GREEN ZONE   Good Control    I feel good    No cough or wheeze    Can work, sleep and play without asthma symptoms       Take your asthma control medicine every day.     1. If exercise triggers your asthma, take your rescue medication    15 minutes before exercise or sports, and    During exercise if you have asthma symptoms  2. Spacer to use with inhaler: If you have a spacer, make sure to use it with your inhaler             YELLOW ZONE Getting Worse  I have ANY of these:    I do not feel good    Cough or wheeze    Chest feels tight    Wake up at night   1. Keep taking your Green Zone medications  2. Start taking your rescue medicine:    every 20 minutes for up to 1 hour. Then every 4 hours for 24-48 hours.  3. If you stay in the Yellow Zone for more than 12-24 hours, contact your doctor.  4. If you do not return to the Green Zone in 12-24 hours or you get worse, start taking your oral steroid medicine if prescribed by your provider.           RED ZONE Medical Alert - Get Help  I have ANY of these:    I feel awful    Medicine is not helping    Breathing getting harder    Trouble walking or talking    Nose opens wide to breathe       1. Take your rescue medicine NOW  2. If your provider has prescribed an oral steroid medicine, start taking it NOW  3. Call your doctor NOW  4. If you are still in the Red Zone after 20 minutes and you have not reached your doctor:    Take your rescue medicine again and    Call 911 or go to the emergency room right  away    See your regular doctor within 2 weeks of an Emergency Room or Urgent Care visit for follow-up treatment.          Annual Reminders:  Meet with Asthma Educator,  Flu Shot in the Fall, consider Pneumonia Vaccination for patients with asthma (aged 19 and older).    Pharmacy:    [facility] [place] [address] [id number] - [place], [place] - [address] AT [facility]    Electronically signed by Christy Spann NP   Date: 02/15/23                    Asthma Triggers  How To Control Things That Make Your Asthma Worse    Triggers are things that make your asthma worse.  Look at the list below to help you find your triggers and   what you can do about them. You can help prevent asthma flare-ups by staying away from your triggers.      Trigger                                                          What you can do   Cigarette Smoke  Tobacco smoke can make asthma worse. Do not allow smoking in your home, car or around you.  Be sure no one smokes at a child s day care or school.  If you smoke, ask your health care provider for ways to help you quit.  Ask family members to quit too.  Ask your health care provider for a referral to Quit Plan to help you quit smoking, or call 4-450-951-PLAN.     Colds, Flu, Bronchitis  These are common triggers of asthma. Wash your hands often.  Don t touch your eyes, nose or mouth.  Get a flu shot every year.     Dust Mites  These are tiny bugs that live in cloth or carpet. They are too small to see. Wash sheets and blankets in hot water every week.   Encase pillows and mattress in dust mite proof covers.  Avoid having carpet if you can. If you have carpet, vacuum weekly.   Use a dust mask and HEPA vacuum.   Pollen and Outdoor Mold  Some people are allergic to trees, grass, or weed pollen, or molds. Try to keep your windows closed.  Limit time out doors when pollen count is high.   Ask you health care  provider about taking medicine during allergy season.     Animal Dander  Some people are allergic to skin flakes, urine or saliva from pets with fur or feathers. Keep pets with fur or feathers out of your home.    If you can t keep the pet outdoors, then keep the pet out of your bedroom.  Keep the bedroom door closed.  Keep pets off cloth furniture and away from stuffed toys.     Mice, Rats, and Cockroaches  Some people are allergic to the waste from these pests.   Cover food and garbage.  Clean up spills and food crumbs.  Store grease in the refrigerator.   Keep food out of the bedroom.   Indoor Mold  This can be a trigger if your home has high moisture. Fix leaking faucets, pipes, or other sources of water.   Clean moldy surfaces.  Dehumidify basement if it is damp and smelly.   Smoke, Strong Odors, and Sprays  These can reduce air quality. Stay away from strong odors and sprays, such as perfume, powder, hair spray, paints, smoke incense, paint, cleaning products, candles and new carpet.   Exercise or Sports  Some people with asthma have this trigger. Be active!  Ask your doctor about taking medicine before sports or exercise to prevent symptoms.    Warm up for 5-10 minutes before and after sports or exercise.     Other Triggers of Asthma  Cold air:  Cover your nose and mouth with a scarf.  Sometimes laughing or crying can be a trigger.  Some medicines and food can trigger asthma.

## 2023-02-14 NOTE — PROGRESS NOTES
"  Assessment & Plan     Mild intermittent asthma without complication  Well controlled.  - albuterol (PROAIR HFA/PROVENTIL HFA/VENTOLIN HFA) 108 (90 Base) MCG/ACT inhaler  Dispense: 36 g; Refill: 3         BMI:   Estimated body mass index is 33.25 kg/m  as calculated from the following:    Height as of this encounter: 1.88 m (6' 2\").    Weight as of this encounter: 117.5 kg (259 lb).       Return in about 1 year (around 2/14/2024) for Routine preventive, with me, in person.    Christy Spann NP  [facility]    Romy Vaqzuez is a 37 year old who presents for medication refill.  Patient has a history of mild intermittent asthma.  This was diagnosed in his 20s.  Triggers include exercise, especially heavy cardiac exercise.  He also gets triggered with secondhand smoke and fall allergens.  He uses albuterol as needed.  Generally using this about 2 to 3 days/week, but primarily with exercise.  When he gets symptoms, he will get chest tightness and shortness of breath.  This is generally relieved with albuterol.  Patient denies any chronic cough, wheezing, or shortness of breath.  No history of smoking.    Review of Systems   Pertinent items in HPI        Objective    /82 (BP Location: Right arm, Patient Position: Sitting, Cuff Size: Adult Regular)   Pulse 81   Temp 97.7  F (36.5  C) (Oral)   Resp 18   Ht 1.88 m (6' 2\")   Wt 117.5 kg (259 lb)   SpO2 98%   BMI 33.25 kg/m    Body mass index is 33.25 kg/m .  Physical Exam   GENERAL: healthy, alert and no distress  RESP: lungs clear to auscultation - no rales, rhonchi or wheezes  CV: regular rate and rhythm, normal S1 S2, no S3 or S4, no murmur, click or rub, no peripheral edema            "

## 2023-04-22 ENCOUNTER — HEALTH MAINTENANCE LETTER (OUTPATIENT)
Age: 38
End: 2023-04-22

## 2023-04-25 ENCOUNTER — OFFICE VISIT (OUTPATIENT)
Dept: FAMILY MEDICINE | Facility: CLINIC | Age: 38
End: 2023-04-25
Payer: COMMERCIAL

## 2023-04-25 VITALS
HEART RATE: 91 BPM | TEMPERATURE: 97.5 F | DIASTOLIC BLOOD PRESSURE: 80 MMHG | OXYGEN SATURATION: 99 % | BODY MASS INDEX: 34.79 KG/M2 | SYSTOLIC BLOOD PRESSURE: 130 MMHG | WEIGHT: 271 LBS

## 2023-04-25 DIAGNOSIS — R07.9 CHEST PAIN, UNSPECIFIED TYPE: Primary | ICD-10-CM

## 2023-04-25 LAB
ANION GAP SERPL CALCULATED.3IONS-SCNC: 11 MMOL/L (ref 7–15)
BUN SERPL-MCNC: 17.7 MG/DL (ref 6–20)
CALCIUM SERPL-MCNC: 9.7 MG/DL (ref 8.6–10)
CHLORIDE SERPL-SCNC: 105 MMOL/L (ref 98–107)
CHOLEST SERPL-MCNC: 217 MG/DL
CREAT SERPL-MCNC: 1.03 MG/DL (ref 0.67–1.17)
DEPRECATED HCO3 PLAS-SCNC: 25 MMOL/L (ref 22–29)
GFR SERPL CREATININE-BSD FRML MDRD: >90 ML/MIN/1.73M2
GLUCOSE SERPL-MCNC: 99 MG/DL (ref 70–99)
HDLC SERPL-MCNC: 41 MG/DL
LDLC SERPL CALC-MCNC: 144 MG/DL
NONHDLC SERPL-MCNC: 176 MG/DL
POTASSIUM SERPL-SCNC: 4.8 MMOL/L (ref 3.4–5.3)
SODIUM SERPL-SCNC: 141 MMOL/L (ref 136–145)
TRIGL SERPL-MCNC: 162 MG/DL

## 2023-04-25 PROCEDURE — 99213 OFFICE O/P EST LOW 20 MIN: CPT | Performed by: NURSE PRACTITIONER

## 2023-04-25 PROCEDURE — 93010 ELECTROCARDIOGRAM REPORT: CPT | Performed by: INTERNAL MEDICINE

## 2023-04-25 PROCEDURE — 36415 COLL VENOUS BLD VENIPUNCTURE: CPT | Performed by: NURSE PRACTITIONER

## 2023-04-25 PROCEDURE — 93005 ELECTROCARDIOGRAM TRACING: CPT | Performed by: NURSE PRACTITIONER

## 2023-04-25 PROCEDURE — 80061 LIPID PANEL: CPT | Performed by: NURSE PRACTITIONER

## 2023-04-25 PROCEDURE — 80048 BASIC METABOLIC PNL TOTAL CA: CPT | Performed by: NURSE PRACTITIONER

## 2023-04-25 NOTE — PROGRESS NOTES
"  Assessment & Plan     Chest pain, unspecified type  EKG completed and personally reviewed as NSR.  I have a low suspicion for this being cardiac.  Discussed alternatives including musculoskeletal chest pain, anxiety, asthma, or acid reflux.  His symptoms sound more musculoskeletal.  Decided to get some labs today to assess his overall cardiac risk.  I also discussed obtaining a stress test to rule out any ischemia.  He would like to wait on a stress test at this point.  We discussed symptoms that would warrant urgent follow-up including increased shortness of breath, radiation of pain, or primarily exertional chest pain.  If symptoms continue, I would recommend cardiac workup including stress test and/or echocardiogram.   - EKG 12-lead, tracing only  - Lipid panel reflex to direct LDL Fasting  - Basic metabolic panel       BMI:   Estimated body mass index is 34.79 kg/m  as calculated from the following:    Height as of 2/14/23: 1.88 m (6' 2\").    Weight as of this encounter: 122.9 kg (271 lb).       Christy Spann NP  [facility]   George is a 38 year old who presents with complaints of intermittent chest pain.  This has been going on for a couple of months.  Patient will get intermittent \"soreness\" to the sternum about once a week that last 10 to 15 minutes.  He had a bad episode last month, which triggered him to see me in clinic.  Generally the symptoms get better with time and rest.  He does not have any radiation of pain with the symptoms, shortness of breath, nausea, or diaphoresis.  Patient is exercising regularly and does not have chest pain with exertion.  He does have a history of asthma that is well controlled.  Denies any new cough or wheezing.  Utilizes his albuterol once a week.  No lower extremity edema or significant weight gain.  Denies any palpitations or irregular heartbeat.  No heartburn or abdominal pain.  He does not necessarily feel anxious, although " the chest pain is causing some anxiety for him.  Pertinent family history of mitral valve replacement in several of his maternal uncles.  His maternal grandfather had a heart attack.      Chest Pain (Feels like someones stomping on chest bone, ongoing for few months but got bad last month )        4/25/2023     9:09 AM   Additional Questions   Roomed by Mathew X     Chest Pain     History of Present Illness       Reason for visit:  Chest pain occasionally  Symptom onset:  1-2 weeks ago            Review of Systems   Cardiovascular: Positive for chest pain.          Objective    /80 (BP Location: Left arm, Patient Position: Sitting, Cuff Size: Adult Large)   Pulse 91   Temp 97.5  F (36.4  C) (Temporal)   Wt 122.9 kg (271 lb)   SpO2 99%   BMI 34.79 kg/m    Body mass index is 34.79 kg/m .  Physical Exam   GENERAL: healthy, alert and no distress  RESP: lungs clear to auscultation - no rales, rhonchi or wheezes  CV: regular rate and rhythm, normal S1 S2, no S3 or S4, no murmur, click or rub, no peripheral edema. No chest or sternal tenderness palpated.

## 2023-04-26 LAB
ATRIAL RATE - MUSE: 68 BPM
DIASTOLIC BLOOD PRESSURE - MUSE: NORMAL MMHG
INTERPRETATION ECG - MUSE: NORMAL
P AXIS - MUSE: -7 DEGREES
PR INTERVAL - MUSE: 124 MS
QRS DURATION - MUSE: 92 MS
QT - MUSE: 388 MS
QTC - MUSE: 412 MS
R AXIS - MUSE: 17 DEGREES
SYSTOLIC BLOOD PRESSURE - MUSE: NORMAL MMHG
T AXIS - MUSE: 10 DEGREES
VENTRICULAR RATE- MUSE: 68 BPM

## 2023-07-15 ENCOUNTER — HEALTH MAINTENANCE LETTER (OUTPATIENT)
Age: 38
End: 2023-07-15

## 2023-08-03 ENCOUNTER — ANCILLARY PROCEDURE (OUTPATIENT)
Dept: GENERAL RADIOLOGY | Facility: CLINIC | Age: 38
End: 2023-08-03
Attending: PHYSICIAN ASSISTANT
Payer: COMMERCIAL

## 2023-08-03 ENCOUNTER — OFFICE VISIT (OUTPATIENT)
Dept: FAMILY MEDICINE | Facility: CLINIC | Age: 38
End: 2023-08-03
Payer: COMMERCIAL

## 2023-08-03 ENCOUNTER — ANCILLARY ORDERS (OUTPATIENT)
Age: 38
End: 2023-08-03

## 2023-08-03 VITALS
HEART RATE: 82 BPM | TEMPERATURE: 98.1 F | RESPIRATION RATE: 20 BRPM | SYSTOLIC BLOOD PRESSURE: 124 MMHG | OXYGEN SATURATION: 97 % | BODY MASS INDEX: 34.63 KG/M2 | DIASTOLIC BLOOD PRESSURE: 79 MMHG | WEIGHT: 269.7 LBS

## 2023-08-03 DIAGNOSIS — M79.645 PAIN OF FINGER OF LEFT HAND: Primary | ICD-10-CM

## 2023-08-03 PROCEDURE — 73140 X-RAY EXAM OF FINGER(S): CPT | Mod: TC | Performed by: RADIOLOGY

## 2023-08-03 PROCEDURE — 99213 OFFICE O/P EST LOW 20 MIN: CPT | Performed by: PHYSICIAN ASSISTANT

## 2023-08-03 NOTE — PATIENT INSTRUCTIONS
Ice topically to the area 20 minutes on each hour while awake.  Take precautions to avoid damage to the skin.  After 2 days, transition to ice topically 20 minutes 3 times per day.  After 2 days may add heat and alternate ice and heat.  Ibuprofen 600 mg (3 tablets) 3 times a day with food for analgesia and anti-inflammatory effect.  Do not use the ibuprofen if you have contraindications to using NSAIDs.  Injuries to the extremities may be elevated above level of the heart continuously for the first 2 days as much as possible.  Use of melanie taping for fingers.    Return to see primary care provider or return to clinic for reexamination and shannon-ray in 2 weeks if anything less than 100% resolution or return to pain-free weightbearing and full activities.

## 2023-08-03 NOTE — PROGRESS NOTES
Patient presents with:  Hand Injury: Last night pointer finger on the left hand - while working out      Clinical Decision Making:  Patient is treated with melanie taping.  X-rays did not show fracture or avulsion or volar plate injury. Expected course of resolution and indication for return was gone over and questions were answered to patient/parent's satisfaction before discharge.          ICD-10-CM    1. Pain of finger of left hand  M79.645 XR Finger Left G/E 2 Views          Patient Instructions   Ice topically to the area 20 minutes on each hour while awake.  Take precautions to avoid damage to the skin.  After 2 days, transition to ice topically 20 minutes 3 times per day.  After 2 days may add heat and alternate ice and heat.  Ibuprofen 600 mg (3 tablets) 3 times a day with food for analgesia and anti-inflammatory effect.  Do not use the ibuprofen if you have contraindications to using NSAIDs.  Injuries to the extremities may be elevated above level of the heart continuously for the first 2 days as much as possible.  Use of melanie taping for fingers.    Return to see primary care provider or return to clinic for reexamination and shannon-ray in 2 weeks if anything less than 100% resolution or return to pain-free weightbearing and full activities.              HPI:  Luís Gamez is a 38 year old male who presents today for injury to the left index finger.  Patient states he threw a medicine ball onto the floor and it bounced up and jammed the left finger.  Patient was using a rigid splint on the index finger which was painful for the patient.  No other complaints to address on the left hand or wrist.    History obtained from chart review and the patient.    Problem List:  2021-04: Mild intermittent asthma  2021-04: Obesity (BMI 30-39.9)      Past Medical History:   Diagnosis Date    Asthma        Social History     Tobacco Use    Smoking status: Never    Smokeless tobacco: Never   Substance Use Topics    Alcohol  use: Yes     Alcohol/week: 3.0 - 4.0 standard drinks of alcohol       Review of Systems  As above in HPI otherwise negative.    Vitals:    08/03/23 1754   BP: 124/79   BP Location: Right arm   Patient Position: Sitting   Cuff Size: Adult Large   Pulse: 82   Resp: 20   Temp: 98.1  F (36.7  C)   TempSrc: Oral   SpO2: 97%   Weight: 122.3 kg (269 lb 11.2 oz)       General: Patient is resting comfortably no acute distress is afebrile  HEENT: Head is normocephalic atraumatic   Skin: Without rash  Musculoskeletal:  Left index finger is painful to palpation over the PIP joint.  Passive range of motion has full range of motion but active is decreased.  Tenodesis testing shows full range of motion on the left index finger.  No pain over the other carpals and no proximal or distal row carpal pain to palpation to include the scaphoid.    Physical Exam      Labs:  Results for orders placed or performed in visit on 08/03/23   XR Finger Left G/E 2 Views     Status: None    Narrative    EXAM: XR FINGER LEFT G/E 2 VIEWS  LOCATION: [facility]  DATE: 8/3/2023    INDICATION: left index finger PIP joint pain and swelling  COMPARISON: None.      Impression    IMPRESSION: 3 views of the index finger. Normal joint spaces and alignment. No fracture. Soft tissue swelling.         Radiology:  I have personally ordered and preliminarily reviewed the following xray, I have noted no fracture on xray.    At the end of the encounter, I discussed results, diagnosis, medications. Discussed red flags for immediate return to clinic/ER, as well as indications for follow up if no improvement. Patient understood and agreed to plan. Patient was stable for discharge.

## 2024-04-20 ENCOUNTER — OFFICE VISIT (OUTPATIENT)
Dept: FAMILY MEDICINE | Facility: CLINIC | Age: 39
End: 2024-04-20
Payer: COMMERCIAL

## 2024-04-20 VITALS
DIASTOLIC BLOOD PRESSURE: 85 MMHG | TEMPERATURE: 97.8 F | SYSTOLIC BLOOD PRESSURE: 129 MMHG | OXYGEN SATURATION: 98 % | HEART RATE: 83 BPM | RESPIRATION RATE: 14 BRPM

## 2024-04-20 DIAGNOSIS — L91.8 INFLAMED SKIN TAG: Primary | ICD-10-CM

## 2024-04-20 PROCEDURE — 11200 RMVL SKIN TAGS UP TO&INC 15: CPT | Performed by: NURSE PRACTITIONER

## 2024-04-20 NOTE — PROGRESS NOTES
Procedure note-  Right axilla skin tag    Area was Cleaned with betadine then injected with small amount of 1% lidocaine with epinephrine.    Used a iris scissor to clip at the base.    Tiny open area after this approximately 2 to 3 mm oozing blood.  Did apply a gauze and had him hold pressure for 5 minutes.

## 2024-04-20 NOTE — PATIENT INSTRUCTIONS
I removed an inflamed skin tag today.  This left a very small wound.  You may use the gauze underneath your arm until the area stops bleeding.    No specific activity restrictions.      Okay for Tylenol or ibuprofen per package instructions.  Watch the area for any sign of infection such as ongoing pus drainage, pain, worsening redness around the area.    Clean with your normal showers with soap and water.  I would avoid soaking the area until the area has completely healed.

## 2024-04-20 NOTE — PROGRESS NOTES
Assessment & Plan     Inflamed skin tag       Inflamed skin tag located under right axilla was oozing a little pus per the patient.  None visible.  Area was minimally attached.  Was able to remove with iris scissor.  See procedure note.    Advised:   You may use the gauze underneath your arm until the area stops bleeding.    No specific activity restrictions.      Okay for Tylenol or ibuprofen per package instructions.  Watch the area for any sign of infection such as ongoing pus drainage, pain, worsening redness around the area.    Clean with your normal showers with soap and water.  I would avoid soaking the area until the area has completely healed.              Return for If no better.    Mable Pham, CNP  [facility]    [place]     George is a 39 year old male who presents to clinic today for the following health issues:  Chief Complaint   Patient presents with    Cyst     Cyst under rt arm has drainage from it      HPI    Patient with skin tag located underneath the arm pit on the right side became inflamed and is slowly oozing pus the last couple days.  Is irritated and tender.          Review of Systems    See HPI         Objective    /85   Pulse 83   Temp 97.8  F (36.6  C) (Oral)   Resp 14   SpO2 98%   Physical Exam  Constitutional:       Appearance: Normal appearance.   Pulmonary:      Effort: Pulmonary effort is normal.   Skin:     Comments: Inflamed skin tag right axilla with evidence of pus pocket within.  No active drainage.   Neurological:      Mental Status: He is alert.   Psychiatric:         Mood and Affect: Mood normal.

## 2024-09-07 ENCOUNTER — HEALTH MAINTENANCE LETTER (OUTPATIENT)
Age: 39
End: 2024-09-07

## 2025-03-27 ENCOUNTER — LAB REQUISITION (OUTPATIENT)
Dept: LAB | Facility: CLINIC | Age: 40
End: 2025-03-27
Payer: COMMERCIAL

## 2025-03-27 DIAGNOSIS — Z00.00 ENCOUNTER FOR GENERAL ADULT MEDICAL EXAMINATION WITHOUT ABNORMAL FINDINGS: ICD-10-CM

## 2025-03-27 PROCEDURE — 80061 LIPID PANEL: CPT | Mod: ORL | Performed by: FAMILY MEDICINE

## 2025-03-28 LAB
CHOLEST SERPL-MCNC: 220 MG/DL
FASTING STATUS PATIENT QL REPORTED: NO
HDLC SERPL-MCNC: 31 MG/DL
LDLC SERPL CALC-MCNC: 109 MG/DL
NONHDLC SERPL-MCNC: 189 MG/DL
TRIGL SERPL-MCNC: 399 MG/DL